# Patient Record
Sex: FEMALE | Race: WHITE | NOT HISPANIC OR LATINO
[De-identification: names, ages, dates, MRNs, and addresses within clinical notes are randomized per-mention and may not be internally consistent; named-entity substitution may affect disease eponyms.]

---

## 2021-10-05 PROBLEM — Z00.00 ENCOUNTER FOR PREVENTIVE HEALTH EXAMINATION: Status: ACTIVE | Noted: 2021-10-05

## 2021-10-06 ENCOUNTER — NON-APPOINTMENT (OUTPATIENT)
Age: 26
End: 2021-10-06

## 2021-10-06 ENCOUNTER — APPOINTMENT (OUTPATIENT)
Dept: BREAST CENTER | Facility: CLINIC | Age: 26
End: 2021-10-06
Payer: COMMERCIAL

## 2021-10-06 VITALS
SYSTOLIC BLOOD PRESSURE: 112 MMHG | HEART RATE: 91 BPM | HEIGHT: 70 IN | DIASTOLIC BLOOD PRESSURE: 75 MMHG | WEIGHT: 130 LBS | BODY MASS INDEX: 18.61 KG/M2

## 2021-10-06 DIAGNOSIS — N63.0 UNSPECIFIED LUMP IN UNSPECIFIED BREAST: ICD-10-CM

## 2021-10-06 DIAGNOSIS — Z78.9 OTHER SPECIFIED HEALTH STATUS: ICD-10-CM

## 2021-10-06 DIAGNOSIS — N63.20 UNSPECIFIED LUMP IN THE LEFT BREAST, UNSPECIFIED QUADRANT: ICD-10-CM

## 2021-10-06 DIAGNOSIS — R92.2 INCONCLUSIVE MAMMOGRAM: ICD-10-CM

## 2021-10-06 PROCEDURE — 99204 OFFICE O/P NEW MOD 45 MIN: CPT

## 2021-10-06 NOTE — REVIEW OF SYSTEMS
[Breast Pain] : breast pain [Breast Lump] : breast lump [Negative] : Heme/Lymph [Chills] : chills [Feeling Tired] : feeling tired [Recent Weight Loss (___ Lbs)] : recent [unfilled] ~Ulb weight loss [Sore Throat] : sore throat [Vomiting] : vomiting [Skin Lesions] : skin lesion [Skin Wound] : skin wound [Swollen Glands] : swollen glands [Swollen Glands In The Neck] : swollen glands in the neck

## 2021-10-07 NOTE — ASSESSMENT
[FreeTextEntry1] : 25 yo female with left breast mass and swollen nodes\par she is not high risk\par \par We reviewed risk reduction strategies including maintaining a BMI <25, limiting red meat intake and alcoholic beverages to 3 per week and exercise (150 min/ week low intensity or 75 min/week high intensity). And maintaining a normal vitamin D level.\par \par reassured patient that her breast exam is WNL\par advised her to see heme/onc for work up of lymphadenopathy \par plan repeat targeted sonogram left breast\par if negative plan cbe 3 months to ensure stability of exam \par \par She knows to call or return sooner should any concerns or questions arise.\par

## 2021-10-07 NOTE — PHYSICAL EXAM
[Normocephalic] : normocephalic [Atraumatic] : atraumatic [Supple] : supple [No Supraclavicular Adenopathy] : no supraclavicular adenopathy [Examined in the supine and seated position] : examined in the supine and seated position [Symmetrical] : symmetrical [None] : no ptosis [No dominant masses] : no dominant masses in right breast  [No Nipple Retraction] : no left nipple retraction [No Nipple Discharge] : no left nipple discharge [Breast Nipple Inversion] : nipples inverted [No Axillary Lymphadenopathy] : no left axillary lymphadenopathy [No Edema] : no edema [No Rashes] : no rashes [No Ulceration] : no ulceration [de-identified] : in area of patient concern there is FGC and prominent rib

## 2021-10-07 NOTE — HISTORY OF PRESENT ILLNESS
[FreeTextEntry1] : This is a 26 year old female referred by Dr. Blake for lymphadenopathy and mastodynia. She states she recently had an inflamed jaw lymph node which comes and goes. She has noted the left breast mass since August which she states is bigger before her period and then gets better. But she started her period yesterday and is still feeling the mass. She also states she thinks she has swollen lymph nodes in her groin and neck as well. She was seen in the ER 3 weeks ago at [facility] for what she thinks was food poisoning and thinks someone was trying to assasinate her (but is joking). In early August when she was returning from a 2 week trip in [place] she felt the lymph node in the jaw. 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 She thinks has lost about 5 pounds. She is happier with this new job. \par \par She does SBE. \par She has  noticed a change in her breast or a breast lump on left, it is moveable, tender to touch,stayed same size.\par She has not noticed a change in her nipple or nipple area.\par She has not noticed a change in the skin of the breast.\par She is not experiencing nipple discharge.\par She is experiencing left breast pain which has improved since her period started.\par She has not noticed a lump or lymph node under the armpit. \par \par BREAST CANCER RISK FACTORS\par Menarche: 14\par Date of LMP: 10/5/2021\par Menopause: pre\par Grav:   0   Para: 0\par Age at first live birth: n/a\par Nursed: n/a\par Hysterectomy: no\par Oophorectomy: no\par OCP: no\par HRT: no\par Last pap/pelvic exam: 7/12/2021 WNL \par Related family history: none\par Ashkenazi: no\par Mastery risk assessment: BRCAPRO 14.1% TCv7 17.7% TCv8 17.7%\par BRCA testing: no\par Bra size: 34C\par \par Last mammogram: 8/13/2021 left         Location: [facility] \par Report reviewed.                                 Images reviewed.\par Results: BIRADS 2\par extremely dense breasts. benign, no findings \par \par Last ultrasound: 8/13/2021 left            Location: [facility] \par Report reviewed.                                 Images reviewed. \par Results: BIRADS 2\par negative no findings. \par \par Last MRI: never                                            Location:\par Report reviewed.\par

## 2021-10-07 NOTE — CONSULT LETTER
[Dear  ___] : Dear  [unfilled], [( Thank you for referring [unfilled] for consultation for _____ )] : Thank you for referring [unfilled] for consultation for [unfilled] [Please see my note below.] : Please see my note below. [Consult Closing:] : Thank you very much for allowing me to participate in the care of this patient.  If you have any questions, please do not hesitate to contact me. [Sincerely,] : Sincerely, [DrRachelle  ___] : Dr. MATTHEW [FreeTextEntry3] : Zaynab Rosario MS DO\par Breast Surgeon\par [facility] \[place] [address]\par

## 2022-02-28 ENCOUNTER — APPOINTMENT (OUTPATIENT)
Dept: BREAST CENTER | Facility: CLINIC | Age: 27
End: 2022-02-28

## 2022-05-02 ENCOUNTER — OUTPATIENT (OUTPATIENT)
Dept: OUTPATIENT SERVICES | Facility: HOSPITAL | Age: 27
LOS: 1 days | End: 2022-05-02
Payer: COMMERCIAL

## 2022-05-02 ENCOUNTER — APPOINTMENT (OUTPATIENT)
Dept: ULTRASOUND IMAGING | Facility: HOSPITAL | Age: 27
End: 2022-05-02
Payer: COMMERCIAL

## 2022-05-02 PROCEDURE — 76882 US LMTD JT/FCL EVL NVASC XTR: CPT | Mod: 26,RT

## 2022-05-02 PROCEDURE — 76882 US LMTD JT/FCL EVL NVASC XTR: CPT

## 2025-08-06 ENCOUNTER — NON-APPOINTMENT (OUTPATIENT)
Age: 30
End: 2025-08-06